# Patient Record
Sex: FEMALE | Race: WHITE
[De-identification: names, ages, dates, MRNs, and addresses within clinical notes are randomized per-mention and may not be internally consistent; named-entity substitution may affect disease eponyms.]

---

## 2019-07-11 NOTE — HP
HISTORY OF PRESENT ILLNESS:  This is a 48-year-old female, who presents to our

office for evaluation of neck and right arm radiculopathy.  She states she started

having numbness in her middle finger and then after lifting, she had significant

radiating pain from under her shoulder blade and down to the middle of her hand.

She had been taking gabapentin, and decreased activity level significantly over the

last 6 weeks, she is having some improvement.  She was going to start physical

therapy until MRI was done, and then she was just sent to our office.  No

injections.  Weakness in right triceps, finger extension, and finger intrinsics. 



REVIEW OF SYSTEMS:  A 10-point review of systems has been completed and is negative

other than stated in the above HPI. 



PAST MEDICAL HISTORY:  History of diffuse arthritis, cholesterol, chronic pain, and

depression. 



PAST SURGICAL HISTORY:  Hysterectomy, C-sections.



FAMILY HISTORY:  Father is alive.  Mother is alive, diagnosed with cancer.



SOCIAL HISTORY:  The patient is a smoker.  Denies alcohol or drugs.



MEDICATIONS:  Gabapentin, atorvastatin.



ALLERGIES:  NO KNOWN DRUG ALLERGIES.



PHYSICAL EXAMINATION:

CONSTITUTIONAL:  The patient is alert and oriented x3.  Appears nontoxic. 

NECK:  Soft, supple.  No masses are noted.  Range of motion intact, nonpainful.

Tenderness to palpate cervical spine. 

NEUROLOGIC:  Awake, alert, oriented x3.  Memory, attention, fund of knowledge

normal.  Cranial nerves are grossly intact.  Upper extremities; 5/5 bilateral

strength, deltoids, biceps, wrist extension.  4/5 right triceps, finger extension,

finger intrinsics.  Sensation decreased in right pinky and middle finger.  Reflexes

brisk.  No clonus.  Spurling's positive on the right. 

RESPIRATIONS:  Normal work of breathing on room air.



IMAGING STUDIES:  MRI of cervical spine, herniated nucleus pulposus C7-T1 right

greater than C5-C6 central cord compression and compression C4-C5 as well. 



ASSESSMENT AND PLAN:  Cervical neck pain, cervical radiculopathy with herniated

nucleus pulposus.  Dr. Toussaint has offered surgery to perform anterior cervical

discectomy and fusion of C4 through C6 and C7-T1.  The patient states she

understands the risks and is willing to proceed with surgery. 







Job ID:  [id number]

## 2019-07-12 ENCOUNTER — HOSPITAL ENCOUNTER (INPATIENT)
Dept: HOSPITAL 92 - SURG A | Age: 49
LOS: 1 days | Discharge: HOME | DRG: 472 | End: 2019-07-13
Attending: NEUROLOGICAL SURGERY | Admitting: NEUROLOGICAL SURGERY
Payer: COMMERCIAL

## 2019-07-12 VITALS — BODY MASS INDEX: 29.7 KG/M2

## 2019-07-12 DIAGNOSIS — F17.200: ICD-10-CM

## 2019-07-12 DIAGNOSIS — M19.90: ICD-10-CM

## 2019-07-12 DIAGNOSIS — M50.022: ICD-10-CM

## 2019-07-12 DIAGNOSIS — F32.9: ICD-10-CM

## 2019-07-12 DIAGNOSIS — Z90.710: ICD-10-CM

## 2019-07-12 DIAGNOSIS — M50.10: ICD-10-CM

## 2019-07-12 DIAGNOSIS — M50.021: Primary | ICD-10-CM

## 2019-07-12 DIAGNOSIS — M50.13: ICD-10-CM

## 2019-07-12 DIAGNOSIS — G89.29: ICD-10-CM

## 2019-07-12 DIAGNOSIS — G95.20: ICD-10-CM

## 2019-07-12 LAB
APTT PPP: 28.2 SEC (ref 22.9–36.1)
BASOPHILS # BLD AUTO: 0.1 THOU/UL (ref 0–0.2)
BASOPHILS NFR BLD AUTO: 1.1 % (ref 0–1)
EOSINOPHIL # BLD AUTO: 0.6 THOU/UL (ref 0–0.7)
EOSINOPHIL NFR BLD AUTO: 9.3 % (ref 0–10)
HGB BLD-MCNC: 13.4 G/DL (ref 12–16)
INR PPP: 0.9
LYMPHOCYTES # BLD: 2.2 THOU/UL (ref 1.2–3.4)
LYMPHOCYTES NFR BLD AUTO: 35.8 % (ref 21–51)
MCH RBC QN AUTO: 29.7 PG (ref 27–31)
MCV RBC AUTO: 88.6 FL (ref 78–98)
MONOCYTES # BLD AUTO: 0.5 THOU/UL (ref 0.11–0.59)
MONOCYTES NFR BLD AUTO: 8.7 % (ref 0–10)
NEUTROPHILS # BLD AUTO: 2.8 THOU/UL (ref 1.4–6.5)
NEUTROPHILS NFR BLD AUTO: 45 % (ref 42–75)
PLATELET # BLD AUTO: 289 THOU/UL (ref 130–400)
PROTHROMBIN TIME: 12 SEC (ref 12–14.7)
RBC # BLD AUTO: 4.51 MILL/UL (ref 4.2–5.4)
WBC # BLD AUTO: 6.2 THOU/UL (ref 4.8–10.8)

## 2019-07-12 PROCEDURE — C1776 JOINT DEVICE (IMPLANTABLE): HCPCS

## 2019-07-12 PROCEDURE — 0RG40A0 FUSION OF CERVICOTHORACIC VERTEBRAL JOINT WITH INTERBODY FUSION DEVICE, ANTERIOR APPROACH, ANTERIOR COLUMN, OPEN APPROACH: ICD-10-PCS | Performed by: NEUROLOGICAL SURGERY

## 2019-07-12 PROCEDURE — C1713 ANCHOR/SCREW BN/BN,TIS/BN: HCPCS

## 2019-07-12 PROCEDURE — 0RT30ZZ RESECTION OF CERVICAL VERTEBRAL DISC, OPEN APPROACH: ICD-10-PCS | Performed by: NEUROLOGICAL SURGERY

## 2019-07-12 PROCEDURE — 0RG20A0 FUSION OF 2 OR MORE CERVICAL VERTEBRAL JOINTS WITH INTERBODY FUSION DEVICE, ANTERIOR APPROACH, ANTERIOR COLUMN, OPEN APPROACH: ICD-10-PCS | Performed by: NEUROLOGICAL SURGERY

## 2019-07-12 PROCEDURE — 85610 PROTHROMBIN TIME: CPT

## 2019-07-12 PROCEDURE — 85025 COMPLETE CBC W/AUTO DIFF WBC: CPT

## 2019-07-12 PROCEDURE — 36415 COLL VENOUS BLD VENIPUNCTURE: CPT

## 2019-07-12 PROCEDURE — 85730 THROMBOPLASTIN TIME PARTIAL: CPT

## 2019-07-12 PROCEDURE — 76000 FLUOROSCOPY <1 HR PHYS/QHP: CPT

## 2019-07-12 RX ADMIN — CEFAZOLIN SODIUM SCH MLS: 2 SOLUTION INTRAVENOUS at 20:38

## 2019-07-12 RX ADMIN — ACETAMINOPHEN AND CODEINE PHOSPHATE PRN TAB: 300; 30 TABLET ORAL at 20:33

## 2019-07-12 NOTE — OP
DATE OF PROCEDURE:  07/12/2019



ASSISTANT:  Oralia Freeman PA-C.



PREOPERATIVE INDICATION:  Prevent neurological deterioration.



PREOPERATIVE DIAGNOSES:  Multilevel cervical intervertebral disk disease with

cervical instability at C4-5, cord compression at C5-6 with myelopathy, and both

cord compression and C8 radiculopathy from C7-T1 intervertebral disk. 



POSTOPERATIVE DIAGNOSES:  Multilevel cervical intervertebral disk disease with

cervical instability at C4-5, cord compression at C5-6 with myelopathy, and both

cord compression and C8 radiculopathy from C7-T1 intervertebral disk. 



OPERATIVE PROCEDURE:  Anterior cervical diskectomy, intervertebral arthrodesis,

placement of intervertebral biomechanical device, anterior cervical plating at C4-5

and C5-6, separate plating at C7-T1, operating microscope, local morselized

autograft, and morselized allograft. 



PREOPERATIVE MEDICATION:  Ancef 2 g IV.



DRAIN NUMBER:  0.



DRAIN TYPE:  None.



DESCRIPTION OF PROCEDURE:  The patient was brought to the operating room.  General

endotracheal anesthesia was induced keeping the neck in normal anatomic alignment.

We carefully positioned the patient supine on the operating table with her head

supported by a gel-filled doughnut shaped headrest.  A lateral fluoro radiograph was

used to plan our incision.  The right side of the neck was sterilely prepped and

draped.  We opened it with a 10-blade knife and controlled bleeding with bipolar

cautery.  We dissected sharply to the platysma and we cut this muscle in line with

our incision.  We continued our dissection medial to the sternocleidomastoid and

lateral to the trachea and esophagus.  We arrived into prevertebral space and put a

marker at C4-5.  We took a lateral fluoro radiograph, identified levels upon which

we were operating.  We then elevated the longus colli muscles off the anterior

surface of C4, C5, and C6, as well as C7 and T1.  Self-retaining retractors were

placed under the longus colli muscles at C5.  We placed distraction pins at C4 and

C6 and distracted across both of the intervening interspaces.  We incised the

interspaces with a 15-blade knife and removed disk contents using curettes and

rongeurs.  The operating microscope was brought into field. 



Under microscopic magnification using microsurgical techniques, we removed the

remainder of the intervertebral disk.  We accessed the ventral epidural space with a

micro curette behind the posterior longitudinal ligament and then we used Kerrison

rongeurs to carefully remove the posterior longitudinal ligament and posterior

osteophytes across the entire interspace at C4-C5 and C5-C6.  We decompressed from 1

neural foramen all the way to the other and there was no more dural indentation.  We

then prepared the endplates for grafting using curettes.  We measured the height of

the interspace to 6 mm at C4-5 and 7 mm at C5-6.  The appropriately sized PEEK

intervertebral grafts were brought into the field.  Morselized bone taken during our

osteophytectomy was cleaned of soft tissue, morcellized, and added into

demineralized bone matrix to form our fusion substrate.  The substrate was packed

into the PEEK grafts before they were advanced into their respective interspaces

under radiographic guidance to the appropriate depth.  We then removed our

distraction pins from C4 and 6, and moved our lateral retractors under the longus

colli muscles at C7-T1.  We placed our distraction pins at C7 and T1 and distracted

across the intervening interspace.  In a similar fashion to above, and under the

operating microscope, we removed the entire intervertebral disk.  We accessed the

ventral epidural space with a micro curette.  We used Kerrison rongeurs to remove

the posterior longitudinal ligament across the entire interspace, 1 neural foramen

to the other.  Disk material at the right side of the ventral epidural space was

removed and there was good decompression.  We then used curettes to prepare the

endplates for grafting.  We measured the height of the interspace with a bone rasp

to 7 mm.  A 7 mm PEEK graft was brought into the field, loaded with demineralized

bone matrix and morselized autograft, and advanced into the interspace under

radiographic guidance to the appropriate depth.  We then removed our distraction

pins. 



For fixation, we used 2 separate plates.  A 12 mm plate was brought into the field,

and we drilled  holes through it into the C7 and T1 vertebral segments.  We

affixed the plate using 14 mm variable angle screws.  We used fixed angle screws at

T1. 



A separate plate was brought in the field measuring 28 mm.  We drilled  holes

through the plate into the vertebral bodies at C4, C5, and C6, and we affixed the

plate using 14 mm screws.  Fixed angle screws were used at C6 and variable angle

screws at C4 and C5.  We engaged the locking mechanism over each of the 10 screws we

had placed.  We irrigated copiously with bacitracin irrigation.  AP and lateral

fluoro radiographs confirmed adequate positioning of our instrumentation.  We closed

the wound in anatomical layers and we applied a sterile dressing.  This was a clean

case, no contamination. 







Job ID:  [id number]

## 2019-07-13 VITALS — DIASTOLIC BLOOD PRESSURE: 64 MMHG | SYSTOLIC BLOOD PRESSURE: 100 MMHG

## 2019-07-13 VITALS — TEMPERATURE: 98.6 F

## 2019-07-13 RX ADMIN — ACETAMINOPHEN AND CODEINE PHOSPHATE PRN TAB: 300; 30 TABLET ORAL at 08:20

## 2019-07-13 RX ADMIN — ACETAMINOPHEN AND CODEINE PHOSPHATE PRN TAB: 300; 30 TABLET ORAL at 00:46

## 2019-07-13 RX ADMIN — CEFAZOLIN SODIUM SCH MLS: 2 SOLUTION INTRAVENOUS at 05:10

## 2019-07-13 RX ADMIN — ACETAMINOPHEN AND CODEINE PHOSPHATE PRN TAB: 300; 30 TABLET ORAL at 11:25

## 2019-07-14 NOTE — DIS
DATE OF ADMISSION:  07/12/2019



DATE OF DISCHARGE:  07/13/2019



HOSPITAL COURSE:  Patient is a 48-year-old  female who is postop day #1

status post C4-C6 ACDF, C7-T1 ACDF.  Following the surgery, she was transitioned to

the Med/Surg floor, where her pain has been well-controlled with p.o. medications,

she is tolerating regular diet, and she is voiding appropriately.  She has been up,

ambulating easily throughout the department. 



On exam this morning, she is awake and alert.  She is in no acute distress.  She has

free active range of motion of all extremities.  No focal motor weakness or reflex

asymmetry.  There is a small amount of shadow on her dressing from yesterday,

however, this has not progressed.  She has no active drainage. 



PLAN:  To dismiss the patient home.  I have discussed home care.  The patient will

follow up with Dr. Toussaint in 2 weeks.  She has prescriptions for Tylenol No. 3 at

discharge. 







Job ID:  [id number]